# Patient Record
Sex: MALE | Employment: FULL TIME | URBAN - METROPOLITAN AREA
[De-identification: names, ages, dates, MRNs, and addresses within clinical notes are randomized per-mention and may not be internally consistent; named-entity substitution may affect disease eponyms.]

---

## 2020-09-15 ENCOUNTER — ALLIED HEALTH/NURSE VISIT (OUTPATIENT)
Dept: INTERPRETER SERVICES | Facility: CLINIC | Age: 30
End: 2020-09-15

## 2020-09-15 ENCOUNTER — HOSPITAL ENCOUNTER (EMERGENCY)
Facility: CLINIC | Age: 30
Discharge: HOME | End: 2020-09-15
Attending: NURSE PRACTITIONER | Admitting: NURSE PRACTITIONER

## 2020-09-15 VITALS
RESPIRATION RATE: 18 BRPM | DIASTOLIC BLOOD PRESSURE: 85 MMHG | TEMPERATURE: 97.5 F | SYSTOLIC BLOOD PRESSURE: 125 MMHG | HEART RATE: 63 BPM | OXYGEN SATURATION: 97 %

## 2020-09-15 DIAGNOSIS — S71.112A: ICD-10-CM

## 2020-09-15 PROCEDURE — T1013 SIGN LANG/ORAL INTERPRETER: HCPCS | Mod: U3,TEL

## 2020-09-15 PROCEDURE — 90471 IMMUNIZATION ADMIN: CPT

## 2020-09-15 PROCEDURE — 12032 INTMD RPR S/A/T/EXT 2.6-7.5: CPT

## 2020-09-15 PROCEDURE — 90715 TDAP VACCINE 7 YRS/> IM: CPT | Performed by: NURSE PRACTITIONER

## 2020-09-15 PROCEDURE — 99283 EMERGENCY DEPT VISIT LOW MDM: CPT | Mod: 25

## 2020-09-15 PROCEDURE — 25000128 H RX IP 250 OP 636: Performed by: NURSE PRACTITIONER

## 2020-09-15 RX ADMIN — CLOSTRIDIUM TETANI TOXOID ANTIGEN (FORMALDEHYDE INACTIVATED), CORYNEBACTERIUM DIPHTHERIAE TOXOID ANTIGEN (FORMALDEHYDE INACTIVATED), BORDETELLA PERTUSSIS TOXOID ANTIGEN (GLUTARALDEHYDE INACTIVATED), BORDETELLA PERTUSSIS FILAMENTOUS HEMAGGLUTININ ANTIGEN (FORMALDEHYDE INACTIVATED), BORDETELLA PERTUSSIS PERTACTIN ANTIGEN, AND BORDETELLA PERTUSSIS FIMBRIAE 2/3 ANTIGEN 0.5 ML: 5; 2; 2.5; 5; 3; 5 INJECTION, SUSPENSION INTRAMUSCULAR at 12:45

## 2020-09-15 ASSESSMENT — ENCOUNTER SYMPTOMS
WOUND: 1
NUMBNESS: 0

## 2020-09-15 NOTE — ED PROVIDER NOTES
History     Chief Complaint:    Laceration      The history is provided by the patient. A  was used (Salvadorean).      Pelon Forrest is a 30 year old male who presents with a medial left leg laceration while he was bending over to cut rubber with a razor blade at home less than an hour ago. He states that he is really quite sure what happened as this was an accident. He denies any suicidal ideation or knee numbness or weakness. His reports he thinks his Tdap was about 2 years ago but he is unsure.     Allergies:  No Known Drug Allergies     Medications:    The patient is currently on no regular medications.     Past Medical History:    History reviewed. No pertinent past medical history.    Past Surgical History:    History reviewed. No pertinent surgical history.    Family History:    History reviewed. No pertinent family history.     Social History:  Not on file       Review of Systems   Skin: Positive for wound.   Neurological: Negative for numbness.   Psychiatric/Behavioral: Negative for suicidal ideas.   All other systems reviewed and are negative.    Physical Exam     Patient Vitals for the past 24 hrs:   BP Temp Temp src Pulse Resp SpO2   09/15/20 1135 125/85 97.5  F (36.4  C) Oral 63 18 97 %       Physical Exam  Nursing notes reviewed. Vitals reviewed.  General: Alert. Well kept.  Eyes:  Conjunctiva non-injected, non-icteric.  Neck/Throat: Moist mucous membranes. Normal voice.  Cardiac: Regular rhythm. Normal heart sounds.  Pulmonary: Clear and equal breath sounds bilaterally.   Musculoskeletal: Normal gross range of motion of all 4 extremities. Full strength, flexion, and extension at the knee. Normal sensation to distal foot.   Neurological: Alert and oriented x4.   Skin: 4 cm subcutaneous laceration left mid medial thigh without arterial bleeding.   Psych: Affect normal. Good eye contact.    Emergency Department Course       Procedures    Laceration Repair        LACERATION:  A  subcutaneous clean 4 cm laceration.      LOCATION:  Left mid medial thigh      FUNCTION:  Distally sensation, circulation, motor and tendon function are intact.      ANESTHESIA:  Local using 0.5% Bupivacaine total of 8 mLs      PREPARATION:  Irrigation and Scrubbing with Normal Saline and Shur Clens      DEBRIDEMENT:  no debridement      CLOSURE:  Wound was closed with Two Layers: Subcutaneous layer closed with 2x5.0 Fast Absorbing Gut. Skin closed with 3 x 4.0 Ethylon using horizontal mattress sutures     Interventions:  1230 Adacel 0.5 mL IM    Emergency Department Course:  Past medical records, nursing notes, and vitals reviewed.    1136 I performed an exam of the patient as documented above.     1220 I rechecked the patient and discussed the results of his workup thus far.     Findings and plan explained to the Patient. Patient discharged home with instructions regarding supportive care, medications, and reasons to return. The importance of close follow-up was reviewed.    I personally answered all related questions prior to discharge.     Impression & Plan     Medical Decision Making:  Pelon Forrest is a 30 year old male who presents with a laceration to the left mid medial thigh. The wound was carefully evaluated and explored. The laceration was closed with two layers as noted above. There is no evidence of muscular, tendon, or bony damage with this laceration. No signs of foreign body. Possible complications (infection, scarring) were reviewed with the patient.  Tetanus was updated.  Follow up with primary care will be indicated in 10 days as noted in the discharge section.   Diagnosis:    ICD-10-CM    1. Laceration of thigh without complication, left, initial encounter  S71.112A        Disposition:  Discharged to home.    Scribe Disclosure:  I, Vasu Stern, am serving as a scribe at 11:45 AM on 9/15/2020 to document services personally performed by Jocelyn Regalado, MIRNA based on my observations and the  provider's statements to me.        Jocelyn Regalado, CNP  09/15/20 1921

## 2020-09-15 NOTE — DISCHARGE INSTRUCTIONS
Avoid strain on the left thigh as it may cause the wound to be pulled apart or the stitches to come loose.  Do not soak (swim, bathe) until sutures are removed in 10 days.

## 2020-09-15 NOTE — LETTER
September 15, 2020      To Whom It May Concern:      Pelon Forrest was seen in our Emergency Department today, 09/15/20.  I expect his condition to improve over the next 2-3 days.  He may return to work when inproved  Sincerely,        Amita Reed RN

## 2020-09-15 NOTE — ED AVS SNAPSHOT
Emergency Department  [address] [place] [id number]  Phone:  [phone]  Fax:  [phone]                                    Pelon Forrest   MRN: [id number]    Department:   Emergency Department   Date of Visit:  9/15/2020           After Visit Summary Signature Page    I have received my discharge instructions, and my questions have been answered. I have discussed any challenges I see with this plan with the nurse or doctor.    ..........................................................................................................................................  Patient/Patient Representative Signature      ..........................................................................................................................................  Patient Representative Print Name and Relationship to Patient    ..................................................               ................................................  Date                                   Time    ..........................................................................................................................................  Reviewed by Signature/Title    ...................................................              ..............................................  Date                                               Time          22EPIC Rev 08/18